# Patient Record
Sex: MALE | Race: WHITE | Employment: FULL TIME | URBAN - METROPOLITAN AREA
[De-identification: names, ages, dates, MRNs, and addresses within clinical notes are randomized per-mention and may not be internally consistent; named-entity substitution may affect disease eponyms.]

---

## 2017-01-20 ENCOUNTER — NURSE ONLY (OUTPATIENT)
Dept: FAMILY MEDICINE CLINIC | Age: 47
End: 2017-01-20

## 2017-01-20 DIAGNOSIS — R79.89 ELEVATED LFTS: Primary | ICD-10-CM

## 2017-01-20 LAB
ALBUMIN SERPL-MCNC: 4.7 G/DL (ref 3.4–5)
ALP BLD-CCNC: 73 U/L (ref 40–129)
ALT SERPL-CCNC: 37 U/L (ref 10–40)
AST SERPL-CCNC: 28 U/L (ref 15–37)
BILIRUB SERPL-MCNC: 0.7 MG/DL (ref 0–1)
BILIRUBIN DIRECT: <0.2 MG/DL (ref 0–0.3)
BILIRUBIN, INDIRECT: NORMAL MG/DL (ref 0–1)
HAV IGM SER IA-ACNC: NORMAL
HEPATITIS B CORE IGM ANTIBODY: NORMAL
HEPATITIS B SURFACE ANTIGEN INTERPRETATION: NORMAL
HEPATITIS C ANTIBODY INTERPRETATION: NORMAL
TOTAL PROTEIN: 6.7 G/DL (ref 6.4–8.2)

## 2017-01-20 PROCEDURE — 36415 COLL VENOUS BLD VENIPUNCTURE: CPT | Performed by: FAMILY MEDICINE

## 2017-02-14 ENCOUNTER — OFFICE VISIT (OUTPATIENT)
Dept: ORTHOPEDIC SURGERY | Age: 47
End: 2017-02-14

## 2017-02-14 VITALS
BODY MASS INDEX: 24.44 KG/M2 | SYSTOLIC BLOOD PRESSURE: 136 MMHG | HEIGHT: 69 IN | WEIGHT: 165 LBS | HEART RATE: 74 BPM | DIASTOLIC BLOOD PRESSURE: 81 MMHG

## 2017-02-14 DIAGNOSIS — M19.019 ACROMIOCLAVICULAR JOINT ARTHRITIS: ICD-10-CM

## 2017-02-14 DIAGNOSIS — M25.511 RIGHT SHOULDER PAIN, UNSPECIFIED CHRONICITY: Primary | ICD-10-CM

## 2017-02-14 PROCEDURE — 99204 OFFICE O/P NEW MOD 45 MIN: CPT | Performed by: ORTHOPAEDIC SURGERY

## 2017-02-14 PROCEDURE — 73030 X-RAY EXAM OF SHOULDER: CPT | Performed by: ORTHOPAEDIC SURGERY

## 2017-02-21 ENCOUNTER — OFFICE VISIT (OUTPATIENT)
Dept: ORTHOPEDIC SURGERY | Age: 47
End: 2017-02-21

## 2017-02-21 VITALS
HEART RATE: 77 BPM | DIASTOLIC BLOOD PRESSURE: 89 MMHG | SYSTOLIC BLOOD PRESSURE: 140 MMHG | WEIGHT: 165 LBS | BODY MASS INDEX: 25.01 KG/M2 | HEIGHT: 68 IN

## 2017-02-21 DIAGNOSIS — M75.81 ROTATOR CUFF TENDINITIS, RIGHT: Primary | ICD-10-CM

## 2017-02-21 DIAGNOSIS — M19.019 AC (ACROMIOCLAVICULAR) JOINT ARTHRITIS: ICD-10-CM

## 2017-02-21 PROBLEM — M75.80 ROTATOR CUFF TENDINITIS: Status: ACTIVE | Noted: 2017-02-21

## 2017-02-21 PROCEDURE — 99214 OFFICE O/P EST MOD 30 MIN: CPT | Performed by: ORTHOPAEDIC SURGERY

## 2017-02-21 PROCEDURE — 20610 DRAIN/INJ JOINT/BURSA W/O US: CPT | Performed by: ORTHOPAEDIC SURGERY

## 2017-06-29 ENCOUNTER — TELEPHONE (OUTPATIENT)
Dept: FAMILY MEDICINE CLINIC | Age: 47
End: 2017-06-29

## 2018-12-14 ENCOUNTER — OFFICE VISIT (OUTPATIENT)
Dept: ORTHOPEDIC SURGERY | Age: 48
End: 2018-12-14
Payer: COMMERCIAL

## 2018-12-14 ENCOUNTER — TELEPHONE (OUTPATIENT)
Dept: ORTHOPEDIC SURGERY | Age: 48
End: 2018-12-14

## 2018-12-14 VITALS
SYSTOLIC BLOOD PRESSURE: 146 MMHG | HEART RATE: 70 BPM | BODY MASS INDEX: 25.18 KG/M2 | WEIGHT: 170 LBS | DIASTOLIC BLOOD PRESSURE: 89 MMHG | HEIGHT: 69 IN

## 2018-12-14 DIAGNOSIS — M54.2 NECK PAIN: ICD-10-CM

## 2018-12-14 DIAGNOSIS — M25.512 LEFT SHOULDER PAIN, UNSPECIFIED CHRONICITY: Primary | ICD-10-CM

## 2018-12-14 PROCEDURE — 3017F COLORECTAL CA SCREEN DOC REV: CPT | Performed by: ORTHOPAEDIC SURGERY

## 2018-12-14 PROCEDURE — G8419 CALC BMI OUT NRM PARAM NOF/U: HCPCS | Performed by: ORTHOPAEDIC SURGERY

## 2018-12-14 PROCEDURE — G8484 FLU IMMUNIZE NO ADMIN: HCPCS | Performed by: ORTHOPAEDIC SURGERY

## 2018-12-14 PROCEDURE — 1036F TOBACCO NON-USER: CPT | Performed by: ORTHOPAEDIC SURGERY

## 2018-12-14 PROCEDURE — 99214 OFFICE O/P EST MOD 30 MIN: CPT | Performed by: ORTHOPAEDIC SURGERY

## 2018-12-14 PROCEDURE — G8427 DOCREV CUR MEDS BY ELIG CLIN: HCPCS | Performed by: ORTHOPAEDIC SURGERY

## 2018-12-14 RX ORDER — PREDNISONE 10 MG/1
TABLET ORAL
Qty: 30 TABLET | Refills: 0 | Status: SHIPPED | OUTPATIENT
Start: 2018-12-14

## 2018-12-14 RX ORDER — PREDNISONE 10 MG/1
TABLET ORAL
Qty: 30 TABLET | Refills: 0 | Status: SHIPPED | OUTPATIENT
Start: 2018-12-14 | End: 2018-12-14 | Stop reason: SDUPTHER

## 2018-12-14 NOTE — LETTER
Patient Name: Roxana Carmen MRN: [id number]  DOS: 12/14/2018   Diagnosis:   1. Left shoulder pain, unspecified chronicity; biceps tendonitis   2. Neck pain; degenerative disc disease     3. Radicular symptoms of left upper extremity                         Goal:  [x]Decrease Pain and/or Swelling [x]Increase ROM and/or Flexibility     [x]Increase Function                           [x]Increase Strength and/or Endurance   []Other   Evaluation:  [x]Evaluation and Treatment []KT-1000   []Isokinetic Exam   []Preoperative Eval    Recommended Modalities:  [x]Modalities of Choice      []HCVS            []Electrical Stimulation     [] Remove Dressing  []Ultrasound        []TENS/TNS    [] Lumbar Traction           [] Cervical Traction   []Phonophoresis         []Hot Pack/Cold Pack   []PT Treatment, Unlisted []Other:  Therapeutic Exercises:    []Isometrics    []Range of Motion []Progressive Exer. []Balance Coordination   []Flexibility  []ROM Limited  []Total Hip Replacement   []Passive  []ROM Full   []Total Knee Replacement  []Active Assisted    []Shoulder Impingement Prog  []Active   []Tennis Elbow Program   []Capsular Shift Regular        []Isokinetics      []Spine Program   []Straight Leg Raises  [] Gait    []Fixation                   [] Supine                                              [] Running   [] Extension   [] Prone   [] Throwing   [] Stabilization   [] AB    [] Swiss Janiya Merritts   [] AD      [] Spine Eval   [] Cervical Eval  [] Conditioning   [] Lumbar  [] Stationary Bike   [] [place] Track   [] Lumbar Exer.  [] Stairmaster         [] Treadmill   [] Functional Cap [] Aquatic Prog.      [] Return to work    Treatment Program:  Frequency: [] 1x  [x] 2x  [] 3x  [] 4x  [] 5x week  Duration: [x] 1  [] 2  [] 3  [] 4  [] 5 month  Weight Bearing: [] Non  [] 1/4  [] 1/2  [] 3/4  [] Full  ROM: [] Restricted  [] Full  [x] Follow established: DDD of c-spine and left shoulder biceps tendonitis       [] Other:

## 2018-12-14 NOTE — PROGRESS NOTES
[place]. If she ends up needing surgery for her knee they will be down there for the next few months, if not they will likely return in the next week or so. Follow up with us when he returns from [place] either for his MRI results or for re-evaluation. Our contact information was provided. Roxana Carmen is in agreement with this plan. All questions were answered to patient's satisfaction and was encouraged to call with any further questions. Orders Placed This Encounter   Procedures    XR SHOULDER LEFT (MIN 2 VIEWS)    XR CERVICAL SPINE (2-3 VIEWS)    MRI CERVICAL SPINE WO CONTRAST     Standing Status:   Future     Standing Expiration Date:   12/14/2019     Order Specific Question:   Reason for exam:     Answer:   MRI CERVICAL SPINE W/3D  R/O HNP/STRESS FX     Order Specific Question:   Reason for exam:     Answer:   [place] 12/17    Ambulatory referral to Physical Therapy     Referral Priority:   Routine     Referral Type:   Eval and Treat     Referral Reason:   Specialty Services Required     Requested Specialty:   Physical Therapy     Number of Visits Requested:   [address], [address] [facility]  Date:    12/14/2018        During this examination, I, [address] [place] [address], [facility], functioned as a scribe for Dr. Rey Wynn. The history taking and physical examination were performed by Dr. Rey Wynn. All counseling during the appointment was performed between the patient and Dr. Rey Wynn. 12/14/2018 2:12 PM      This dictation was performed with a verbal recognition program (DRAGON) and it was checked for errors. It is possible that there are still dictated errors within this office note. If so, please bring any errors to my attention for an addendum. All efforts were made to ensure that this office note is accurate.            I personally reviewed the patient's pain scale, review of systems, family history, social history, past medical history, allergies and

## 2018-12-17 ENCOUNTER — TELEPHONE (OUTPATIENT)
Dept: ORTHOPEDIC SURGERY | Age: 48
End: 2018-12-17

## 2018-12-17 DIAGNOSIS — M62.838 CERVICAL PARASPINAL MUSCLE SPASM: Primary | ICD-10-CM

## 2018-12-17 RX ORDER — METAXALONE 800 MG/1
800 TABLET ORAL 3 TIMES DAILY PRN
Qty: 30 TABLET | Refills: 0 | OUTPATIENT
Start: 2018-12-17 | End: 2018-12-27

## 2018-12-19 ENCOUNTER — TELEPHONE (OUTPATIENT)
Dept: ORTHOPEDIC SURGERY | Age: 48
End: 2018-12-19